# Patient Record
Sex: FEMALE | Race: WHITE
[De-identification: names, ages, dates, MRNs, and addresses within clinical notes are randomized per-mention and may not be internally consistent; named-entity substitution may affect disease eponyms.]

---
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
## 2023-03-09 ENCOUNTER — HOSPITAL ENCOUNTER (EMERGENCY)
Dept: HOSPITAL 75 - ER | Age: 23
Discharge: HOME | End: 2023-03-09
Payer: SELF-PAY

## 2023-03-09 VITALS — WEIGHT: 188.05 LBS | HEIGHT: 60 IN | BODY MASS INDEX: 36.92 KG/M2

## 2023-03-09 VITALS — SYSTOLIC BLOOD PRESSURE: 139 MMHG | DIASTOLIC BLOOD PRESSURE: 83 MMHG

## 2023-03-09 DIAGNOSIS — Z28.310: ICD-10-CM

## 2023-03-09 DIAGNOSIS — Z20.822: ICD-10-CM

## 2023-03-09 DIAGNOSIS — F17.290: ICD-10-CM

## 2023-03-09 DIAGNOSIS — J06.9: Primary | ICD-10-CM

## 2023-03-09 DIAGNOSIS — R10.13: ICD-10-CM

## 2023-03-09 LAB
ALBUMIN SERPL-MCNC: 4.1 GM/DL (ref 3.2–4.5)
ALP SERPL-CCNC: 74 U/L (ref 40–136)
ALT SERPL-CCNC: 12 U/L (ref 0–55)
BASOPHILS # BLD AUTO: 0.1 10^3/UL (ref 0–0.1)
BASOPHILS NFR BLD AUTO: 1 % (ref 0–10)
BILIRUB SERPL-MCNC: 0.2 MG/DL (ref 0.1–1)
BUN/CREAT SERPL: 8
CALCIUM SERPL-MCNC: 9.1 MG/DL (ref 8.5–10.1)
CHLORIDE SERPL-SCNC: 106 MMOL/L (ref 98–107)
CO2 SERPL-SCNC: 25 MMOL/L (ref 21–32)
CREAT SERPL-MCNC: 0.87 MG/DL (ref 0.6–1.3)
EOSINOPHIL # BLD AUTO: 0.5 10^3/UL (ref 0–0.3)
EOSINOPHIL NFR BLD AUTO: 5 % (ref 0–10)
GFR SERPLBLD BASED ON 1.73 SQ M-ARVRAT: 97 ML/MIN
GLUCOSE SERPL-MCNC: 95 MG/DL (ref 70–105)
HCT VFR BLD CALC: 40 % (ref 35–52)
HGB BLD-MCNC: 13.2 G/DL (ref 11.5–16)
LYMPHOCYTES # BLD AUTO: 2.6 10^3/UL (ref 1–4)
LYMPHOCYTES NFR BLD AUTO: 23 % (ref 12–44)
MANUAL DIFFERENTIAL PERFORMED BLD QL: NO
MCH RBC QN AUTO: 26 PG (ref 25–34)
MCHC RBC AUTO-ENTMCNC: 33 G/DL (ref 32–36)
MCV RBC AUTO: 80 FL (ref 80–99)
MONOCYTES # BLD AUTO: 1 10^3/UL (ref 0–1)
MONOCYTES NFR BLD AUTO: 9 % (ref 0–12)
NEUTROPHILS # BLD AUTO: 7 10^3/UL (ref 1.8–7.8)
NEUTROPHILS NFR BLD AUTO: 63 % (ref 42–75)
PLATELET # BLD: 344 10^3/UL (ref 130–400)
PMV BLD AUTO: 11.1 FL (ref 9–12.2)
POTASSIUM SERPL-SCNC: 3.2 MMOL/L (ref 3.6–5)
PROT SERPL-MCNC: 7.3 GM/DL (ref 6.4–8.2)
SODIUM SERPL-SCNC: 141 MMOL/L (ref 135–145)
WBC # BLD AUTO: 11.2 10^3/UL (ref 4.3–11)

## 2023-03-09 PROCEDURE — 80053 COMPREHEN METABOLIC PANEL: CPT

## 2023-03-09 PROCEDURE — 36415 COLL VENOUS BLD VENIPUNCTURE: CPT

## 2023-03-09 PROCEDURE — 87636 SARSCOV2 & INF A&B AMP PRB: CPT

## 2023-03-09 PROCEDURE — 93005 ELECTROCARDIOGRAM TRACING: CPT

## 2023-03-09 PROCEDURE — 85025 COMPLETE CBC W/AUTO DIFF WBC: CPT

## 2023-03-09 PROCEDURE — 71045 X-RAY EXAM CHEST 1 VIEW: CPT

## 2023-03-09 NOTE — DIAGNOSTIC IMAGING REPORT
INDICATION: Sore throat, cough, pain. 



FINDINGS: The lungs are clear. Lung volumes symmetric and normal.

No effusion, pneumothorax or pneumomediastinum. Cardiomediastinal

contours normal. Diaphragm is well visualized. No free air

beneath the diaphragms. 



IMPRESSION: Normal frontal chest x-ray. 



Dictated by: 



  Dictated on workstation # [id number]

## 2023-03-09 NOTE — ED COUGH/URI
General


Chief Complaint:  Cough/Cold/Flu Symptoms


Stated Complaint:  BACK/CHEST PAIN HEADACHE/SORE THROAT/SOA


Nursing Triage Note:  


PT AMB TO [facility] W C/O SORE THROAT, COUGH, LOWER BACK PAIN, LIGHTHEADEDNESS, AND 


CP WORSE W DEEP BREATHING SX 03/07/23. PT [id number].


Source:  patient


Exam Limitations:  no limitations





History of Present Illness


Date Seen by Provider:  Mar 9, 2023


Time Seen by Provider:  19:27


Initial Comments


SurePatient is a 23-year-old female who presents ED with cough, chest pain, 

abdominal pain breath, sore throat.  Symptoms started 2 to 3 days ago.  She st

ates the coughing became worse yesterday.  Reports sputum production with 

burning sensation in her chest.  This is worse with deep inspiration or cough.  

She reports the pain below her breast that is sharp that radiates to the left 

abdomen.  She reports some back pain and sore throat.  Other family members at 

home with similar symptoms.  Denies vomiting, diarrhea, dysuria, hematuria, 

recent travels or surgeries, cardiac history, history of asthma or smoking.





Allergies and Home Medications


Allergies


Coded Allergies:  


     No Known Drug Allergies (Unverified , 3/9/23)





Patient Home Medication List


Home Medication List Reviewed:  Yes





Review of Systems


Review of Systems


Constitutional:  No chills, No diaphoresis, No fever, No malaise, No weakness


EENTM:  No ear pain, No blurred vision, No double vision, No mouth pain, No 

mouth swelling, No throat pain, No throat swelling


Respiratory:  cough, short of breath


Cardiovascular:  chest pain


Gastrointestinal:  abdominal pain; No diarrhea, No nausea, No vomiting


Genitourinary:  No decreased output


Musculoskeletal:  back pain; No joint pain





All Other Systems Reviewed


Negative Unless Noted:  Yes





Past Medical-Social-Family Hx


Patient Social History


Tobacco Use?:  No


Use of E-Cig and/or Vaping dev:  Yes


E-Cig or Vaping type used:  Nicotine


Substance use?:  No


Alcohol Use?:  No





Immunizations Up To Date


Influenza Vaccine Up-to-Date:  No; Not Current


First/Initial COVID19 Vaccinat:  none


Second COVID19 Vaccination Javi:  none


Third COVID19 Vaccination Date:  none


COVID19 Vaccine :  none





Seasonal Allergies


Seasonal Allergies:  No





Past Medical History


Surgeries:  No


Respiratory:  No


Cardiac:  No


Neurological:  No


Genitourinary:  No


Gastrointestinal:  No


Musculoskeletal:  No


Endocrine:  No


HEENT:  No


Cancer:  No


Psychosocial:  Yes


Anxiety, Depression


Integumentary:  No





Family Medical History


No Pertinent Family Hx





Physical Exam





Vital Signs - First Documented








 3/9/23





 19:18


 


Temp 36.6


 


Pulse 93


 


Resp 18


 


B/P (MAP) 138/89 (105)


 


Pulse Ox 98


 


O2 Delivery Room Air





Capillary Refill : Less Than 3 Seconds


Height: '"


Weight: lbs. oz. kg; 36.00 BMI


Method:


General Appearance:  WD/WN, no apparent distress


Eyes:  Bilateral Eye Normal Inspection, Bilateral Eye PERRL, Bilateral Eye EOMI


HEENT:  PERRL/EOMI, normal ENT inspection, TMs normal, pharynx normal


Neck:  non-tender, full range of motion


Respiratory:  chest non-tender, lungs clear, normal breath sounds, no respi

ratory distress, no accessory muscle use


Cardiovascular:  regular rate, rhythm, no edema, no gallop


Gastrointestinal:  normal bowel sounds, non tender, soft


Extremities:  normal range of motion, non-tender, normal inspection


Neurologic/Psychiatric:  CNs II-XII nml as tested, no motor/sensory deficits, 

alert, normal mood/affect, oriented x 3


Skin:  normal color, warm/dry





Progress/Results/Core Measures


Suspected Sepsis


SIRS


Temperature: 


Pulse: 93 


Respiratory Rate: 18


 


Laboratory Tests


3/9/23 19:35: White Blood Count 11.2H


Blood Pressure 138 /89 


Mean: 105


 





Laboratory Tests


3/9/23 19:35: 


Creatinine 0.87, Platelet Count 344, Total Bilirubin 0.2








Results/Orders


Lab Results





Laboratory Tests








Test


 3/9/23


19:26 3/9/23


19:35 Range/Units


 


 


Influenza Type A (RT-PCR) Not Detected   Not Detecte  


 


Influenza Type B (RT-PCR) Not Detected   Not Detecte  


 


SARS-CoV-2 RNA (RT-PCR) Not Detected   Not Detecte  


 


White Blood Count


 


 11.2 H


 4.3-11.0


10^3/uL


 


Red Blood Count


 


 5.00 


 3.80-5.11


10^6/uL


 


Hemoglobin  13.2  11.5-16.0  g/dL


 


Hematocrit  40  35-52  %


 


Mean Corpuscular Volume  80  80-99  fL


 


Mean Corpuscular Hemoglobin  26  25-34  pg


 


Mean Corpuscular Hemoglobin


Concent 


 33 


 32-36  g/dL





 


Red Cell Distribution Width  14.7 H 10.0-14.5  %


 


Platelet Count


 


 344 


 130-400


10^3/uL


 


Mean Platelet Volume  11.1  9.0-12.2  fL


 


Immature Granulocyte % (Auto)  0   %


 


Neutrophils (%) (Auto)  63  42-75  %


 


Lymphocytes (%) (Auto)  23  12-44  %


 


Monocytes (%) (Auto)  9  0-12  %


 


Eosinophils (%) (Auto)  5  0-10  %


 


Basophils (%) (Auto)  1  0-10  %


 


Neutrophils # (Auto)


 


 7.0 


 1.8-7.8


10^3/uL


 


Lymphocytes # (Auto)


 


 2.6 


 1.0-4.0


10^3/uL


 


Monocytes # (Auto)


 


 1.0 


 0.0-1.0


10^3/uL


 


Eosinophils # (Auto)


 


 0.5 H


 0.0-0.3


10^3/uL


 


Basophils # (Auto)


 


 0.1 


 0.0-0.1


10^3/uL


 


Immature Granulocyte # (Auto)


 


 0.0 


 0.0-0.1


10^3/uL


 


Sodium Level  141  135-145  MMOL/L


 


Potassium Level  3.2 L 3.6-5.0  MMOL/L


 


Chloride Level  106    MMOL/L


 


Carbon Dioxide Level  25  21-32  MMOL/L


 


Anion Gap  10  5-14  MMOL/L


 


Blood Urea Nitrogen  7  7-18  MG/DL


 


Creatinine


 


 0.87 


 0.60-1.30


MG/DL


 


Estimat Glomerular Filtration


Rate 


 97 


  





 


BUN/Creatinine Ratio  8   


 


Glucose Level  95    MG/DL


 


Calcium Level  9.1  8.5-10.1  MG/DL


 


Corrected Calcium  9.0  8.5-10.1  MG/DL


 


Total Bilirubin  0.2  0.1-1.0  MG/DL


 


Aspartate Amino Transf


(AST/SGOT) 


 13 


 5-34  U/L





 


Alanine Aminotransferase


(ALT/SGPT) 


 12 


 0-55  U/L





 


Alkaline Phosphatase  74    U/L


 


Total Protein  7.3  6.4-8.2  GM/DL


 


Albumin  4.1  3.2-4.5  GM/DL








My Orders





Orders - RONNIE ARELLANO PA


Covid 19 Inhouse Test (3/9/23 19:19)


Influenza A And B By Pcr (3/9/23 19:19)


Ekg Tracing (3/9/23 19:26)


Chest 1 View, Ap/Pa Only (3/9/23 19:26)


Cbc With Automated Diff (3/9/23 19:26)


Comprehensive Metabolic Panel (3/9/23 19:26)





Vital Signs/I&O











 3/9/23





 19:18


 


Temp 36.6


 


Pulse 93


 


Resp 18


 


B/P (MAP) 138/89 (105)


 


Pulse Ox 98


 


O2 Delivery Room Air





Capillary Refill : Less Than 3 Seconds








Blood Pressure Mean:                    105











ECG


Comment


Sinus rhythm, 81 bpm, QRS duration 86 MS, IL interval 142 MS QTc 391 MS.





Departure


Communication (PCP)


Patient with chest pain, cough, sore throat and flulike symptoms.  Reviewed 

previous visits, H&P's, lab work.  Patient vital signs stable.  She is not 

hypoxic or tachycardic or febrile.  COVID influenza was ordered secondary to her

current complaints.  COVID influenza was negative.  Due to the epigastric pain 

CBC and CMP was ordered which was unremarkable.  Chest x-ray was negative for p

neumonia.  EKG normal sinus rhythm without evidence of cardiac arrhythmia.  No 

known cardiac history.  Denies history of asthma.  No wheezing noted on exam.  

Upper respiratory infection likely viral.  Pain underneath the chest likely 

secondary to coughing.  She does not appear in acute distress.  Other family 

members with similar symptoms.  Discussed conservative treatment with Mucinex, 

Robitussin, Sudafed, Tylenol ibuprofen.  Discussed hydration.  Return precaution

were discussed such as worsening symptoms, cough or chest pain.  Follow-up with 

PCP in 2 to 3 days for reevaluation





Impression





   Primary Impression:  


   Upper respiratory infection


Disposition:  01 HOME, SELF-CARE


Condition:  Stable





Departure-Patient Inst.


Decision time for Depature:  20:12


Referrals:  


[facility] [place]





NO,LOCAL PHYSICIAN (PCP)


Primary Care Physician


Patient Instructions:  Viral Upper Respiratory Infection, Adult (DC)





Add. Discharge Instructions:  


Conservative treatment with Tylenol, ibuprofen.  Mucinex for cough.  If any 

worsening symptoms return back to ED for further evaluation





All discharge instructions reviewed with patient and/or family. Voiced 

understanding.


Work/School Note:  Work Release Form   Date Seen in the Emergency Department:  

Mar 9, 2023


   Return to Work:  Mar 13, 2023











RONNIE ARELLANO           Mar 9, 2023 19:29

## 2023-03-29 ENCOUNTER — HOSPITAL ENCOUNTER (EMERGENCY)
Dept: HOSPITAL 75 - ER | Age: 23
Discharge: HOME | End: 2023-03-29
Payer: MEDICAID

## 2023-03-29 VITALS — HEIGHT: 62.01 IN | BODY MASS INDEX: 33.25 KG/M2 | WEIGHT: 182.98 LBS

## 2023-03-29 VITALS — SYSTOLIC BLOOD PRESSURE: 114 MMHG | DIASTOLIC BLOOD PRESSURE: 89 MMHG

## 2023-03-29 DIAGNOSIS — R11.2: ICD-10-CM

## 2023-03-29 DIAGNOSIS — Z28.310: ICD-10-CM

## 2023-03-29 DIAGNOSIS — R51.9: ICD-10-CM

## 2023-03-29 DIAGNOSIS — R10.31: Primary | ICD-10-CM

## 2023-03-29 LAB
ALBUMIN SERPL-MCNC: 4.2 GM/DL (ref 3.2–4.5)
ALP SERPL-CCNC: 67 U/L (ref 40–136)
ALT SERPL-CCNC: 14 U/L (ref 0–55)
AMORPH SED URNS QL MICRO: (no result) /LPF
APTT PPP: YELLOW S
BACTERIA #/AREA URNS HPF: (no result) /HPF
BASOPHILS # BLD AUTO: 0.1 10^3/UL (ref 0–0.1)
BASOPHILS NFR BLD AUTO: 1 % (ref 0–10)
BILIRUB SERPL-MCNC: 0.2 MG/DL (ref 0.1–1)
BILIRUB UR QL STRIP: NEGATIVE
BUN/CREAT SERPL: 9
CALCIUM SERPL-MCNC: 9.1 MG/DL (ref 8.5–10.1)
CHLORIDE SERPL-SCNC: 105 MMOL/L (ref 98–107)
CO2 SERPL-SCNC: 25 MMOL/L (ref 21–32)
CREAT SERPL-MCNC: 0.77 MG/DL (ref 0.6–1.3)
EOSINOPHIL # BLD AUTO: 0.6 10^3/UL (ref 0–0.3)
EOSINOPHIL NFR BLD AUTO: 5 % (ref 0–10)
FIBRINOGEN PPP-MCNC: CLEAR MG/DL
GFR SERPLBLD BASED ON 1.73 SQ M-ARVRAT: 112 ML/MIN
GLUCOSE SERPL-MCNC: 103 MG/DL (ref 70–105)
GLUCOSE UR STRIP-MCNC: NEGATIVE MG/DL
HCT VFR BLD CALC: 41 % (ref 35–52)
HGB BLD-MCNC: 13.7 G/DL (ref 11.5–16)
KETONES UR QL STRIP: NEGATIVE
LEUKOCYTE ESTERASE UR QL STRIP: NEGATIVE
LIPASE SERPL-CCNC: 29 U/L (ref 8–78)
LYMPHOCYTES # BLD AUTO: 2.4 10^3/UL (ref 1–4)
LYMPHOCYTES NFR BLD AUTO: 18 % (ref 12–44)
MANUAL DIFFERENTIAL PERFORMED BLD QL: NO
MCH RBC QN AUTO: 27 PG (ref 25–34)
MCHC RBC AUTO-ENTMCNC: 33 G/DL (ref 32–36)
MCV RBC AUTO: 80 FL (ref 80–99)
MONOCYTES # BLD AUTO: 0.9 10^3/UL (ref 0–1)
MONOCYTES NFR BLD AUTO: 7 % (ref 0–12)
NEUTROPHILS # BLD AUTO: 9.4 10^3/UL (ref 1.8–7.8)
NEUTROPHILS NFR BLD AUTO: 70 % (ref 42–75)
NITRITE UR QL STRIP: NEGATIVE
PH UR STRIP: 6 [PH] (ref 5–9)
PLATELET # BLD: 300 10^3/UL (ref 130–400)
PMV BLD AUTO: 11.2 FL (ref 9–12.2)
POTASSIUM SERPL-SCNC: 3.3 MMOL/L (ref 3.6–5)
PROT SERPL-MCNC: 7.4 GM/DL (ref 6.4–8.2)
PROT UR QL STRIP: (no result)
RBC #/AREA URNS HPF: (no result) /HPF
SODIUM SERPL-SCNC: 140 MMOL/L (ref 135–145)
SP GR UR STRIP: 1.02 (ref 1.02–1.02)
SQUAMOUS #/AREA URNS HPF: >50 /HPF
WBC # BLD AUTO: 13.3 10^3/UL (ref 4.3–11)
WBC #/AREA URNS HPF: (no result) /HPF

## 2023-03-29 PROCEDURE — 74176 CT ABD & PELVIS W/O CONTRAST: CPT

## 2023-03-29 PROCEDURE — 85025 COMPLETE CBC W/AUTO DIFF WBC: CPT

## 2023-03-29 PROCEDURE — 36415 COLL VENOUS BLD VENIPUNCTURE: CPT

## 2023-03-29 PROCEDURE — 80053 COMPREHEN METABOLIC PANEL: CPT

## 2023-03-29 PROCEDURE — 83690 ASSAY OF LIPASE: CPT

## 2023-03-29 PROCEDURE — 81000 URINALYSIS NONAUTO W/SCOPE: CPT

## 2023-03-29 PROCEDURE — 84703 CHORIONIC GONADOTROPIN ASSAY: CPT

## 2023-03-29 NOTE — ED ABDOMINAL PAIN
General


Stated Complaint:  VOMITING| HEADACHE| ABDOMINAL PAIN


Source of Information:  Patient


Exam Limitations:  No Limitations





History of Present Illness


Date Seen by Provider:  Mar 29, 2023


Time Seen by Provider:  21:36


Initial Comments


Patient is a 23-year-old female who presents ED with abdominal pain, vomiting, 

headache.  Patient states she started developing right lower quad abdominal pain

2 days ago described as stabbing and intermittent.  This pain became constant 

this evening.  Start developing head pain 2 hours ago.  Pain is described as 

pressure bilateral temporal and occipital head.  No history of migraines or 

history of similar headaches.  Rates head pain 8 out of 10.  She reports 

photophobia.  She states she started vomiting this evening 10+ episodes without 

any bile or hematemesis.  Denies any diarrhea.  History of .  No 

history of kidney stones.  Decreased  urination without any pain.  Started 

having lightheadedness and dizziness today.  Denies of any focal neural deficit,

blurry vision,, chest pain, cough, shortness of breath, fever, diarrhea.  

Patient took Excedrin Migraine without much improvement.  Last menstrual cycle 

was towards the end of  and first part of March





Allergies and Home Medications


Allergies


Coded Allergies:  


     No Known Drug Allergies (Unverified , 3/9/23)





Patient Home Medication List


Home Medication List Reviewed:  Yes





Review of Systems


Review of Systems


Constitutional:  No chills, No diaphoresis, No malaise, No weakness


EENTM:  No Double Vision, No Eye Pain, No Ear Pain, No Mouth Pain, No Mouth 

Swelling


Respiratory:  Denies Cough, Denies Orthopnea


Cardiovascular:  Denies Chest Pain


Gastrointestinal:  Abdominal Pain; Denies Diarrhea; Nausea, Vomiting


Genitourinary:  Denies Burning, Denies Discharge, Denies Flank Pain, Denies 

Hematuria


Musculoskeletal:  back pain; No joint pain


Skin:  No change in color, No change in hair/nails





All Other Systems Reviewed


Negative Unless Noted:  Yes





Past Medical-Social-Family Hx


Immunizations Up To Date


First/Initial COVID19 Vaccinat:  none


Second COVID19 Vaccination Javi:  none


Third COVID19 Vaccination Date:  none





Seasonal Allergies


Seasonal Allergies:  No





Past Medical History


Surgeries:  No


Respiratory:  No


Cardiac:  No


Neurological:  No


Genitourinary:  No


Gastrointestinal:  No


Musculoskeletal:  No


Endocrine:  No


HEENT:  No


Cancer:  No


Psychosocial:  Yes


Anxiety, Depression


Integumentary:  No





Family Medical History


No Pertinent Family Hx





Physical Exam


Vital Signs





Vital Signs - First Documented








 3/29/23





 21:25


 


Temp 36.4


 


Pulse 91


 


Resp 16


 


B/P (MAP) 114/89 (97)





Capillary Refill :


Height/Weight/BMI


Height: '"


Weight: lbs. oz. kg; 36.00 BMI


Method:


General Appearance:  WD/WN, no apparent distress


HEENT:  PERRL/EOMI, normal ENT inspection, TMs normal, pharynx normal


Neck:  non-tender, full range of motion, supple, normal inspection


Respiratory:  chest non-tender, lungs clear, normal breath sounds, no 

respiratory distress, no accessory muscle use


Cardiovascular:  regular rate, rhythm, no edema, no gallop


Gastrointestinal:  normal bowel sounds, soft, no organomegaly, no pulsatile 

mass, tenderness (Right lower quadrant tenderness, right flank tenderness)


Extremities:  normal range of motion, non-tender, normal inspection, no pedal 

edema, no calf tenderness


Back:  normal inspection, no vertebral tenderness, CVA tenderness (R)


Neurologic/Psychiatric:  CNs II-XII nml as tested, no motor/sensory deficits, 

alert, normal mood/affect, oriented x 3


Skin:  normal color





Progress/Results/Core Measures


Results/Orders


Lab Results





Laboratory Tests








Test


 3/29/23


21:29 3/29/23


21:45 Range/Units


 


 


Urine Color YELLOW    


 


Urine Clarity CLEAR    


 


Urine pH 6.0   5-9  


 


Urine Specific Gravity 1.025 H  1.016-1.022  


 


Urine Protein TRACE H  NEGATIVE  


 


Urine Glucose (UA) NEGATIVE   NEGATIVE  


 


Urine Ketones NEGATIVE   NEGATIVE  


 


Urine Nitrite NEGATIVE   NEGATIVE  


 


Urine Bilirubin NEGATIVE   NEGATIVE  


 


Urine Urobilinogen 0.2   < = 1.0  MG/DL


 


Urine Leukocyte Esterase NEGATIVE   NEGATIVE  


 


Urine RBC (Auto) NEGATIVE   NEGATIVE  


 


Urine RBC 0-2    /HPF


 


Urine WBC 0-2    /HPF


 


Urine Squamous Epithelial


Cells >50 H


 


  /HPF





 


Urine Crystals PRESENT H   /LPF


 


Urine Amorphous Sediment


 FEW SLAVA


URATES H 


  /LPF





 


Urine Bacteria MODERATE H   /HPF


 


Urine Casts NONE    /LPF


 


Urine Mucus LARGE H   /LPF


 


Urine Culture Indicated NO    


 


Urine Pregnancy Test NEGATIVE   NEGATIVE  


 


White Blood Count


 


 13.3 H


 4.3-11.0


10^3/uL


 


Red Blood Count


 


 5.15 H


 3.80-5.11


10^6/uL


 


Hemoglobin  13.7  11.5-16.0  g/dL


 


Hematocrit  41  35-52  %


 


Mean Corpuscular Volume  80  80-99  fL


 


Mean Corpuscular Hemoglobin  27  25-34  pg


 


Mean Corpuscular Hemoglobin


Concent 


 33 


 32-36  g/dL





 


Red Cell Distribution Width  14.5  10.0-14.5  %


 


Platelet Count


 


 300 


 130-400


10^3/uL


 


Mean Platelet Volume  11.2  9.0-12.2  fL


 


Immature Granulocyte % (Auto)  0   %


 


Neutrophils (%) (Auto)  70  42-75  %


 


Lymphocytes (%) (Auto)  18  12-44  %


 


Monocytes (%) (Auto)  7  0-12  %


 


Eosinophils (%) (Auto)  5  0-10  %


 


Basophils (%) (Auto)  1  0-10  %


 


Neutrophils # (Auto)


 


 9.4 H


 1.8-7.8


10^3/uL


 


Lymphocytes # (Auto)


 


 2.4 


 1.0-4.0


10^3/uL


 


Monocytes # (Auto)


 


 0.9 


 0.0-1.0


10^3/uL


 


Eosinophils # (Auto)


 


 0.6 H


 0.0-0.3


10^3/uL


 


Basophils # (Auto)


 


 0.1 


 0.0-0.1


10^3/uL


 


Immature Granulocyte # (Auto)


 


 0.1 


 0.0-0.1


10^3/uL


 


Sodium Level  140  135-145  MMOL/L


 


Potassium Level  3.3 L 3.6-5.0  MMOL/L


 


Chloride Level  105    MMOL/L


 


Carbon Dioxide Level  25  21-32  MMOL/L


 


Anion Gap  10  5-14  MMOL/L


 


Blood Urea Nitrogen  7  7-18  MG/DL


 


Creatinine


 


 0.77 


 0.60-1.30


MG/DL


 


Estimat Glomerular Filtration


Rate 


 112 


  





 


BUN/Creatinine Ratio  9   


 


Glucose Level  103    MG/DL


 


Calcium Level  9.1  8.5-10.1  MG/DL


 


Corrected Calcium  8.9  8.5-10.1  MG/DL


 


Total Bilirubin  0.2  0.1-1.0  MG/DL


 


Aspartate Amino Transf


(AST/SGOT) 


 11 


 5-34  U/L





 


Alanine Aminotransferase


(ALT/SGPT) 


 14 


 0-55  U/L





 


Alkaline Phosphatase  67    U/L


 


Total Protein  7.4  6.4-8.2  GM/DL


 


Albumin  4.2  3.2-4.5  GM/DL


 


Lipase  29  8-78  U/L








My Orders





Orders - RONNIE ARELLANO PA


Ua Culture If Indicated (3/29/23 21:24)


Hcg,Qualitative Urine (3/29/23 21:24)


Cbc With Automated Diff (3/29/23 21:35)


Comprehensive Metabolic Panel (3/29/23 21:35)


Lipase (3/29/23 21:35)


Ns Iv 1000 Ml (Sodium Chloride 0.9%) (3/29/23 21:35)


Ondansetron Injection (Zofran Injectio (3/29/23 21:45)


Ketorolac Injection (Toradol Injection) (3/29/23 21:45)


Ct Abd/Pelvis Wo(Kidney Stone) (3/29/23 21:37)


Prochlorperazine Injection (Compazine In (3/29/23 22:30)


Morphine  Injection (Morphine  Injection (3/29/23 22:30)





Medications Given in ED





Vital Signs/I&O











 3/29/23





 21:25


 


Temp 36.4


 


Pulse 91


 


Resp 16


 


B/P (MAP) 114/89 (97)











Departure


Communication (PCP)


Reviewed previous ER visits, H&P, lab testing.  Patient complained of right 

lower quadrant abdominal pain and head pain.  No history of migraines.  

Photophobia.  10 episodes of vomiting today associated to the abdominal pain.  

Patient vital signs stable.  Due to current presentation CBC, CMP, lipase, ua, 

and ct abdomen and pelvis.  Patient urinalysis without strong evidence of 

infection.  Negative for hematuria or pregnancy.  She denies any vaginal 

bleeding or vaginal discharge or concern for sexual transmitted infection.   di

fferential diagnosis of appendicitis, PID, cystitis, viral syndrome, gastritis. 

Refused COVID influenza swab.  CBC showed slightly elevated white blood count at

13.  Slight elevated white blood count with her other previous visit on 3 9.  

Normal hemoglobin.  Chemistry was otherwise grossly unremarkable besides a 

potassium of 3.3.  She was given Zofran for the vomiting and started on a liter 

of fluid.  Toradol for the pain in her lower abdomen.  She states the pain did 

improve some but requesting something different.  She was given dose of morphine

with near resolution of the pain.  Patient migraine continue improve as well.  

She had no focal neural deficits.  No neurological red flag findings suggesting 

CT scan of the head.  CT abdomen and pelvis was negative for appendicitis, 

ureterolithiasis, nephrolithiasis, colitis, pelvic mass, cystic lesion.  She did

not appear in acute distress suggesting ovarian torsion.  Patient states she was

feeling much better at this time.  She was requesting to be discharged.  She 

denies history of GERD, gastritis.  Discharged with Zofran.  Anti-inflammatories

for pain.  Likely viral in nature.  She had no meningeal signs.  Stable for 

discharge.  Follow-up with PCP in 2 to 3 days for reevaluation.  Return back to 

ED if symptoms worsen





Impression





   Primary Impression:  


   Abdominal pain


Disposition:   HOME, SELF-CARE


Condition:  Stable





Departure-Patient Inst.


Decision time for Depature:  22:59


Referrals:  


[facility]





EWA,LOCAL PHYSICIAN (PCP)


Primary Care Physician


Patient Instructions:  Abdominal Pain, Adult ED











RONNIE ARELLANO          Mar 29, 2023 21:37

## 2023-03-30 NOTE — DIAGNOSTIC IMAGING REPORT
PROCEDURE: CT urinary tract, rule out kidney stone.



TECHNIQUE: Multiple contiguous axial images were obtained through

the abdomen and pelvis without the use of intravenous contrast.

Auto Exposure Controls were utilized during the CT exam to meet

ALARA standards for radiation dose reduction. 



INDICATION:  Right flank pain. Nausea and vomiting.



COMPARISON: None



FINDINGS: Included portions of the lung bases are clear.



CT ABDOMEN: Normal appendix is identified. Small bowel loops are

nondistended.



The kidneys, adrenal glands, spleen, pancreas, and liver have an

unremarkable noncontrast CT appearance. There is no loculated

fluid collection, free fluid or free air within the abdomen. No

abnormal mesenteric or retroperitoneal adenopathy is seen.

Osseous structures show no acute abnormalities.



CT PELVIS: Urinary bladder is unopacified. No calculi are seen

within urinary bladder. There is no loculated fluid collection,

free fluid or free air. No abnormal lymph nodes are seen. Osseous

structures show no acute abnormalities.



IMPRESSION:

1. No acute abnormalities seen within the abdomen or pelvis.







Dictated by: 



  Dictated on workstation # [id number]

## 2023-04-13 ENCOUNTER — HOSPITAL ENCOUNTER (EMERGENCY)
Dept: HOSPITAL 75 - ER | Age: 23
LOS: 1 days | Discharge: HOME | End: 2023-04-14
Payer: MEDICAID
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
## 2023-04-13 DIAGNOSIS — Z28.310: ICD-10-CM

## 2023-04-13 DIAGNOSIS — S60.221A: ICD-10-CM
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
## 2023-04-13 DIAGNOSIS — S60.211A: Primary | ICD-10-CM

## 2023-04-13 PROCEDURE — 99283 EMERGENCY DEPT VISIT LOW MDM: CPT

## 2023-04-13 PROCEDURE — 73130 X-RAY EXAM OF HAND: CPT

## 2023-04-13 PROCEDURE — 73110 X-RAY EXAM OF WRIST: CPT

## 2023-04-13 NOTE — ED UPPER EXTREMITY
General


Chief Complaint:  Upper Extremity


Stated Complaint:  RT HAND INJURY 2 DAYS AGO


Nursing Triage Note:  


PT AMB TO [facility] WITH CC OF R HAND AND WRIST PAIN SINCE LAST PM. PT STATES WAS 


CLEANING HER HOUSE WHEN SHE "SLAMMED" THE CABINET DOOR ON HER HAND/WRIST.





History of Present Illness


Date Seen by Provider:  Apr 13, 2023


Time Seen by Provider:  23:42


Initial Comments


23-year-old female presents with right hand and wrist pain.  She reports its 

been going on since yesterday evening when she was cleaning her house and 

"slammed the cabinet on her hand.  She reports that she is.  A couple times with

continue to have some pain.  She states she has pain with any type of range of 

motion.





Allergies and Home Medications


Allergies


Coded Allergies:  


     No Known Drug Allergies (Unverified , 3/9/23)





Patient Home Medication List


Home Medication List Reviewed:  Yes





Review of Systems


Constitutional:  No chills, No fever


EENTM:  no symptoms reported


Respiratory:  no symptoms reported


Cardiovascular:  no symptoms reported


Gastrointestinal:  no symptoms reported


Genitourinary:  no symptoms reported


Musculoskeletal:  see HPI


Skin:  see HPI


Psychiatric/Neurological:  No Symptoms Reported





Past Medical-Social-Family Hx


Patient Social History


Tobacco Use?:  No


Substance use?:  No


Alcohol Use?:  No


Pt feels they are or have been:  No





Immunizations Up To Date


First/Initial COVID19 Vaccinat:  none


Second COVID19 Vaccination Javi:  none


Third COVID19 Vaccination Date:  none





Seasonal Allergies


Seasonal Allergies:  No





Past Medical History


Surgeries:  No


Respiratory:  No


Cardiac:  No


Neurological:  No


Genitourinary:  No


Gastrointestinal:  No


Musculoskeletal:  No


Endocrine:  No


HEENT:  No


Cancer:  No


Psychosocial:  Yes


Anxiety, Depression


Integumentary:  No





Family Medical History


No Pertinent Family Hx





Physical Exam


Vital Signs





Vital Signs - First Documented








 4/13/23





 23:30


 


Pulse 99


 


Resp 18


 


B/P (MAP) 118/81 (93)


 


Pulse Ox 98


 


O2 Delivery Room Air





Capillary Refill : Less Than 3 Seconds


Height, Weight, BMI


Height: '"


Weight: lbs. oz. kg; 33.00 BMI


Method:


General Appearance:  WD/WN, no apparent distress


Neck:  full range of motion, supple


Cardiovascular:  normal peripheral pulses, regular rate, rhythm


Respiratory:  lungs clear, normal breath sounds


Gastrointestinal:  non tender, soft


Shoulder:  normal inspection


Elbow/Forearm:  normal inspection


Wrist:  Yes no evidence of injury; No ecchymosis; Yes soft tissue tenderness


Hand:  Right, ecchymosis (mild right hand ), soft tissue tenderness


Neurologic/Tendon:  normal sensation, normal motor functions


Neurologic/Psychiatric:  no motor/sensory deficits, alert, normal mood/affect, 

oriented x 3


Skin:  ecchymosis (mild )





Progress/Results/Core Measures


Results/Orders


My Orders





Orders - SHANIKA YIP DO


Wrist, Right, 3 Views Or More (4/13/23 23:42)


Hand, Right, 3 Views (4/13/23 23:42)





Vital Signs/I&O











 4/13/23





 23:30


 


Pulse 99


 


Resp 18


 


B/P (MAP) 118/81 (93)


 


Pulse Ox 98


 


O2 Delivery Room Air














Blood Pressure Mean:                    93











Progress


Progress Note :  


Progress Note


Patient's x-rays were reviewed with initial interpretations of negative by me.  

Final interpretation per radiology report.  Patient x-ray shows no acute 

findings.  Patient with contusion.  Discussed supportive care.  She is stable 

and discharged home





Departure


Impression





   Primary Impression:  


   Contusion of hand


   Qualified Codes:  S60.221A - Contusion of right hand, initial encounter


   Additional Impression:  


   Contusion of wrist


   Qualified Codes:  S60.211A - Contusion of right wrist, initial encounter


Disposition:  01 HOME, SELF-CARE


Condition:  Stable





Departure-Patient Inst.


Referrals:  


NO,LOCAL PHYSICIAN (PCP/Family)


Primary Care Physician


Patient Instructions:  Contusion (DC)





Add. Discharge Instructions:  


Ice for 10 to 15 minutes at a time for the next 24 hours then warm moist heat.  

Tylenol or ibuprofen as needed.  He may try Ace wrap to help with pain.  Follow-

up with your primary care provider in 7 to 10 days if symptoms have not improved

or continue to worsen.





All discharge instructions reviewed with patient and/or family. Voiced un

derstanding.











SHANIKA YIP DO               Apr 13, 2023 23:42

## 2023-04-14 VITALS — SYSTOLIC BLOOD PRESSURE: 118 MMHG | DIASTOLIC BLOOD PRESSURE: 81 MMHG

## 2023-04-14 NOTE — DIAGNOSTIC IMAGING REPORT
EXAMINATION: Right hand radiographs, 3 views.



COMPARISON: None. 



HISTORY: 23-year-old female, right hand pain. 



FINDINGS: There is negative ulnar variance. There is no

identified acute fracture. There is no subluxation or

dislocation. There is no radiopaque foreign body. Joint spaces

are well preserved. 



IMPRESSION: 

1. No acute bony abnormality of the right hand.

2. Negative ulnar variance. 



Dictated by: 



  Dictated on workstation # [id number]

## 2023-04-14 NOTE — DIAGNOSTIC IMAGING REPORT
EXAMINATION: Right wrist radiographs, 3 views.



COMPARISON: None. 



HISTORY: 23-year-old female, right wrist pain. 



FINDINGS: There is negative ulnar variance. There is otherwise

unremarkable bone alignment. Bone mineralization appears to be

within normal limits. There is no identified acute fracture.

Joint spaces are well preserved. 



IMPRESSION: 

1. No acute bony abnormality of the right wrist.

2. Negative ulnar variance. 



Dictated by: 



  Dictated on workstation # [id number]

## 2023-05-22 ENCOUNTER — HOSPITAL ENCOUNTER (EMERGENCY)
Dept: HOSPITAL 75 - ER | Age: 23
Discharge: HOME | End: 2023-05-22
Payer: MEDICAID

## 2023-05-22 VITALS — SYSTOLIC BLOOD PRESSURE: 114 MMHG | DIASTOLIC BLOOD PRESSURE: 74 MMHG
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
## 2023-05-22 DIAGNOSIS — S93.401A: Primary | ICD-10-CM
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
## 2023-05-22 DIAGNOSIS — Z28.310: ICD-10-CM

## 2023-05-22 PROCEDURE — 73630 X-RAY EXAM OF FOOT: CPT

## 2023-05-22 PROCEDURE — 99282 EMERGENCY DEPT VISIT SF MDM: CPT

## 2023-05-22 PROCEDURE — 73610 X-RAY EXAM OF ANKLE: CPT

## 2023-05-22 NOTE — ED LOWER EXTREMITY
General


Chief Complaint:  Lower Extremity


Stated Complaint:  FALL/RIGHT ANKLE INJURY


Source:  patient


Exam Limitations:  no limitations


 (RONNIE ARELLANO)





History of Present Illness


Date Seen by Provider:  May 22, 2023


Time Seen by Provider:  21:58


Initial Comments


Patient is a 23-year-old female presents ED with right ankle right foot pain.  

Patient states yesterday evening she was walking down the stairs on her back 

porch when she missed a few steps rotating her right ankle.  Pain has been 

constant.  Difficulty bearing weight.  Walking here today.  Patient denies any 

bruising or swelling.  Has been taken Tylenol ibuprofen.  Pain radiating to the 

right foot.  No history of previous fracture.  She denies falling hitting her 

head, loss of conscious, chest pain, knee pain, nausea, vomiting, diarrhea


 (RONNIE ARELLANO)





Allergies and Home Medications


Allergies


Coded Allergies:  


     No Known Drug Allergies (Unverified , 3/9/23)





Patient Home Medication List


Home Medication List Reviewed:  Yes


 (RONNIE ARELLANO)


Naproxen (Naproxen) 500 Mg Tablet, 500 MG PO Q12H


   Prescribed by: TOVA ENRIQUEZ on 5/22/23 2239





Review of Systems


Constitutional:  No chills, No diaphoresis, No malaise, No weakness


EENTM:  No hearing loss, No ear pain, No blurred vision, No double vision


Respiratory:  No cough, No dyspnea on exertion


Cardiovascular:  No chest pain


Gastrointestinal:  No abdominal pain, No diarrhea, No nausea, No vomiting


Musculoskeletal:  No back pain; joint pain, joint swelling


Skin:  No change in color (RONNIE ARELLANO)





All Other Systems Reviewed


Negative Unless Noted:  Yes


 (RONNIE ARELLANO)





Past Medical-Social-Family Hx


Immunizations Up To Date


First/Initial COVID19 Vaccinat:  none


Second COVID19 Vaccination Javi:  none


Third COVID19 Vaccination Date:  none


 (RONNIE ARELLANO)





Seasonal Allergies


Seasonal Allergies:  No


 (RONNIE ARELLANO)





Past Medical History


Surgeries:  No


Respiratory:  No


Cardiac:  No


Neurological:  No


Genitourinary:  No


Gastrointestinal:  No


Musculoskeletal:  No


Endocrine:  No


HEENT:  No


Cancer:  No


Psychosocial:  Yes


Anxiety, Depression


Integumentary:  No


 (RONNIE ARELLANO)





Family Medical History


No Pertinent Family Hx


 (RONNIE ARELLANO)





Physical Exam


Vital Signs





Vital Signs - First Documented








 5/22/23





 22:07


 


Temp 36.8


 


Pulse 90


 


Resp 18


 


B/P (MAP) 114/74 (87)


 


Pulse Ox 99


 


O2 Delivery Room Air








 (JENNIFFER,KARAN K DO)


Vital Signs


Capillary Refill :  


 (RONNIE ARELLANO)


Height, Weight, BMI


Height: '"


Weight: lbs. oz. kg; 33.00 BMI


Method:


General Appearance:  WD/WN, no apparent distress


HEENT:  PERRL/EOMI, normal ENT inspection, TMs normal, pharynx normal


Neck:  non-tender, full range of motion, supple, normal inspection


Cardiovascular:  regular rate, rhythm, no edema, no gallop, no JVD


Respiratory:  chest non-tender, lungs clear, normal breath sounds, no 

respiratory distress, no accessory muscle use


Gastrointestinal:  normal bowel sounds, non tender, soft, no organomegaly, no 

pulsatile mass


Back:  normal inspection, no CVA tenderness, no vertebral tenderness


Hips:  bilateral hip non-tender, bilateral hip normal inspection, bilateral hip 

normal range of motion


Knees:  bilateral knee non-tender, bilateral knee normal inspection, bilateral 

knee normal range of motion


Ankles:  right ankle pain, right ankle soft tissue tenderness, right ankle 

swelling


Feet:  right foot soft tissue tenderness


Neurologic/Psychiatric:  CNs II-XII nml as tested, no motor/sensory deficits, 

alert, normal mood/affect, oriented x 3


Skin:  normal color (RONNIE ARELLANO)





Progress/Results/Core Measures


Results/Orders


Vital Signs/I&O











 5/22/23





 22:07


 


Temp 36.8


 


Pulse 90


 


Resp 18


 


B/P (MAP) 114/74 (87)


 


Pulse Ox 99


 


O2 Delivery Room Air





 (JENNIFFER,KARAN K DO)





Departure


Communication (PCP)


Reviewed previous ER visits, H&P, lab test.  Injury to her right ankle and foot.

 Pain throughout the right ankle.  Achilles intact.  Neurovascular intact.  Mild

swelling without bruising or redness.  Due to mechanism of injury and pain with 

walking x-ray were ordered.  She is able to bear weight.  X-ray did not show any

evidence of acute fracture.  Patient refused anything for pain.  Has been taking

Tylenol and ibuprofen at home.  Patient was given a Ace wrap.  Aircast for 

comfort.  Orthopedic outpatient follow-up in 7 to 10 days for reevaluation.  

Ice, elevate.  Will discharge from naproxen.  Return precautions were discussed.


 (RONNIE ARELLANO)





Impression





   Primary Impression:  


   Ankle sprain


Disposition:  01 HOME, SELF-CARE


Condition:  Stable





Departure-Patient Inst.


Decision time for Depature:  22:00


 (RONNIE ARELLANO)


Referrals:  


NO,LOCAL PHYSICIAN (PCP)


Primary Care Physician








HERNANDEZ ALEGRIA MD


Patient Instructions:  Ankle Sprain ED


Scripts


Naproxen (Naproxen) 500 Mg Tablet


500 MG PO Q12H for 10 Days, #20 TAB


   Prov: RONNIE ARELLANO         5/22/23


Work/School Note:  Work Release Form   Date Seen in the Emergency Department:  

May 22, 2023


   Return to Work:  May 24, 2023








ATTENDING PHYSICIAN NOTE:


I WAS PHYSICALLY PRESENT AS ER PHYSICIAN, BUT IN WAS NOT INVOLVED IN ANY 

DECISION MAKING OR ANY CARE OF THIS PATIENT, AND I AM NOT COLLABORATING 

PHYSICIAN. 


 (KARAN ABAD DO)











RONNIE ARELLANO          May 22, 2023 22:00


KARAN ABAD DO                 May 23, 2023 02:49

## 2023-05-23 NOTE — DIAGNOSTIC IMAGING REPORT
HISTORY: Medial right ankle pain.



TECHNIQUE: 3 views of the right ankle.



COMPARISON: None



FINDINGS:



No acute fracture or dislocation is seen in the right ankle.

Alignment appears normal. Joint spaces are preserved. Ankle

mortise is symmetric and the talar dome is intact. There is no

significant joint effusion. An os trigonum is noted. There is a

small well-corticated os supra naviculare.



IMPRESSION:

1. No acute osseous abnormality is seen in the right ankle.



Dictated by: 



  Dictated on workstation # [id number]

## 2023-05-23 NOTE — DIAGNOSTIC IMAGING REPORT
HISTORY: Right foot pain.



TECHNIQUE: 3 views of the right foot.



COMPARISON: None



FINDINGS:



No acute fracture or dislocation is seen in the right foot.

Alignment appears normal. Joint spaces are preserved. No cortical

erosions are seen.



IMPRESSION:

1. No acute osseous abnormality is seen in the right foot.



Dictated by: 



  Dictated on workstation # [id number]